# Patient Record
Sex: MALE | Race: WHITE
[De-identification: names, ages, dates, MRNs, and addresses within clinical notes are randomized per-mention and may not be internally consistent; named-entity substitution may affect disease eponyms.]

---

## 2021-06-16 PROBLEM — Z00.00 ENCOUNTER FOR PREVENTIVE HEALTH EXAMINATION: Status: ACTIVE | Noted: 2021-06-16

## 2021-06-18 ENCOUNTER — APPOINTMENT (OUTPATIENT)
Dept: OTOLARYNGOLOGY | Facility: CLINIC | Age: 56
End: 2021-06-18

## 2021-09-22 ENCOUNTER — APPOINTMENT (OUTPATIENT)
Dept: OTOLARYNGOLOGY | Facility: CLINIC | Age: 56
End: 2021-09-22
Payer: COMMERCIAL

## 2021-09-22 VITALS
HEIGHT: 70 IN | HEART RATE: 85 BPM | BODY MASS INDEX: 34.36 KG/M2 | DIASTOLIC BLOOD PRESSURE: 90 MMHG | WEIGHT: 240 LBS | SYSTOLIC BLOOD PRESSURE: 136 MMHG

## 2021-09-22 DIAGNOSIS — J34.89 OTHER SPECIFIED DISORDERS OF NOSE AND NASAL SINUSES: ICD-10-CM

## 2021-09-22 DIAGNOSIS — Z80.8 FAMILY HISTORY OF MALIGNANT NEOPLASM OF OTHER ORGANS OR SYSTEMS: ICD-10-CM

## 2021-09-22 DIAGNOSIS — R06.89 OTHER ABNORMALITIES OF BREATHING: ICD-10-CM

## 2021-09-22 DIAGNOSIS — Z78.9 OTHER SPECIFIED HEALTH STATUS: ICD-10-CM

## 2021-09-22 DIAGNOSIS — R09.81 NASAL CONGESTION: ICD-10-CM

## 2021-09-22 DIAGNOSIS — Z82.49 FAMILY HISTORY OF ISCHEMIC HEART DISEASE AND OTHER DISEASES OF THE CIRCULATORY SYSTEM: ICD-10-CM

## 2021-09-22 DIAGNOSIS — R09.82 POSTNASAL DRIP: ICD-10-CM

## 2021-09-22 DIAGNOSIS — Z80.42 FAMILY HISTORY OF MALIGNANT NEOPLASM OF PROSTATE: ICD-10-CM

## 2021-09-22 DIAGNOSIS — Z85.46 PERSONAL HISTORY OF MALIGNANT NEOPLASM OF PROSTATE: ICD-10-CM

## 2021-09-22 DIAGNOSIS — J32.9 CHRONIC SINUSITIS, UNSPECIFIED: ICD-10-CM

## 2021-09-22 DIAGNOSIS — Z86.39 PERSONAL HISTORY OF OTHER ENDOCRINE, NUTRITIONAL AND METABOLIC DISEASE: ICD-10-CM

## 2021-09-22 PROCEDURE — 31231 NASAL ENDOSCOPY DX: CPT

## 2021-09-22 PROCEDURE — 99244 OFF/OP CNSLTJ NEW/EST MOD 40: CPT | Mod: 25

## 2021-09-22 RX ORDER — SILDENAFIL CITRATE 10 MG/ML
10 POWDER, FOR SUSPENSION ORAL
Refills: 0 | Status: ACTIVE | COMMUNITY

## 2021-09-22 RX ORDER — FLUTICASONE PROPIONATE 50 MCG
50 SPRAY, SUSPENSION NASAL
Refills: 0 | Status: ACTIVE | COMMUNITY

## 2021-09-22 RX ORDER — SODIUM CHLORIDE 0.65 %
0.65 AEROSOL, SPRAY (ML) NASAL
Refills: 0 | Status: ACTIVE | COMMUNITY

## 2021-09-22 RX ORDER — ATORVASTATIN CALCIUM 20 MG/1
20 TABLET, FILM COATED ORAL
Refills: 0 | Status: ACTIVE | COMMUNITY

## 2021-09-22 NOTE — HISTORY OF PRESENT ILLNESS
[de-identified] : 56 year old male referred by Dr. Beverley Glover, ENT, for difficulty breathing through the nose, nasal congestion, sinus pain, sinus pressure, post nasal drip, recurrent sinus infections.  States usually has 2-3 sinus infections every year, treated with antibiotics and steroids.  States has been using Flonase and saline spray daily for the past year.  CT scan of sinus done on 03/19/21, impression: polypoid inflammatory changes in the maxillary sinuses, right ethmoid air cell complex and right sphenoid sinus.  There is mucosal disease that also extends into but does not occlude the left ethmoid infundibulum.  Nasal septal deviation to the right with right-sided bony septal spur.  There are some variations in ethmoid pneumatization which may potentially predispose this patient to bouts of inflammatory sinus disease.

## 2021-09-22 NOTE — PROCEDURE
[Image(s) Captured] : image(s) captured and filed [Video Captured] : video captured and filed [Topical Lidocaine] : topical lidocaine [Oxymetazoline HCl] : oxymetazoline HCl [Flexible Endoscope] : examined with the flexible endoscope [Serial Number: ___] : Serial Number: [unfilled] [Osteomeatal Pathology] : osteomeatal pathology [Recalcitrant Symptoms] : recalcitrant symptoms  [Anatomical Abnormality] : anatomical abnormality [Anterior rhinoscopy insufficient to account for symptoms] : anterior rhinoscopy insufficient to account for symptoms [FreeTextEntry6] : Pre-op indication(s): Nasal obstruction recurrent sinusitis\par Post-op indication(s): Deviated septum obstruction therefore tract of the sinus\par Verbal consent obtained from patient.\par “Anterior rhinoscopy insufficient to account for symptoms” \par Details for procedure: \par Scope #: 209\par Type of scope:    flexible fiber optic telescope  X   Rigid glass telescope \par Anesthesia and/or vasoconstriction was achieved topically by using: \par 4% Lidocaine spray   0.05% Oxymetazoline     Other ______ \par The following anatomic sites were directly examined in a sequential fashion: \par The scope was introduced in the nasal passage between the middle and inferior turbinates to exam the inferior portion of the middle meatus and the fontanelle, as well as the maxillary ostia. Next, the scope was passed medially and posteriorly to the middle turbinates to examine the sphenoethmoid recess and the superior turbinate region. \par Upon visualization the finders are as follows: \par Nasal Septum:    Deviated to right   Spur   right   Contacting Middle turbinate.\par Bleeding site cauterized:    Anterior   left   right   Posterior   left   right \par Method:   Silver Nitrate   YAG Laser    Electrocautery ______ \par Right Side: \par * Mucosa: Normal\par * Mucous: Normal\par * Polyp: Normal\par * Inferior Turbinate: Normal\par * Middle Turbinate: Normal\par * Superior Turbinate: Normal\par * Inferior Meatus: Normal\par * Middle Meatus: Normal\par * Super Meatus: Normal\par * Sphenoethmoidal Recess: Normal\par Left Side: \par * Mucosa: Normal\par * Mucous: Normal\par * Polyp: Normal\par * Inferior Turbinate: Normal\par * Middle Turbinate: Normal\par * Superior Turbinate: Normal\par * Inferior Meatus: Normal\par * Middle Meatus: Normal\par * Super Meatus: Normal\par * Sphenoethmoidal Recess: Normal\par The patient tolerated the procedure well without any complications.\par \par \par

## 2021-09-22 NOTE — DATA REVIEWED
[de-identified] : Nasal septal deviation to the right with right-sided bony septal spur contacting the right middle turbinate polypoid inflammatory changes in both maxillary sinuses right posterior ethmoid and right sphenoid, Left ethmoid disease

## 2021-09-22 NOTE — PHYSICAL EXAM
[] : septum deviated to the right [Midline] : trachea located in midline position [Normal] : no rashes [FreeTextEntry1] : Nasal obstruction recurrent sinusitis , obese [de-identified] : thick [de-identified] : swollen

## 2021-09-22 NOTE — REASON FOR VISIT
[Initial Consultation] : an initial consultation for [FreeTextEntry2] : referred by Dr. Beverley Glover, ENT, for difficulty breathing through the nose, nasal congestion, sinus pain, sinus pressure, post nasal drip, recurrent sinus infections

## 2021-09-22 NOTE — CONSULT LETTER
[Dear  ___] : Dear  [unfilled], [Consult Letter:] : I had the pleasure of evaluating your patient, [unfilled]. [Please see my note below.] : Please see my note below. [Consult Closing:] : Thank you very much for allowing me to participate in the care of this patient.  If you have any questions, please do not hesitate to contact me. [Sincerely,] : Sincerely, [FreeTextEntry3] : Bo Cash MD, JORDI, FACS\par  Department Otolaryngology\par Director of [facility]\par Professor of Otolaryngology, \par [employer]\par

## 2023-12-27 ENCOUNTER — APPOINTMENT (OUTPATIENT)
Dept: ORTHOPEDIC SURGERY | Facility: CLINIC | Age: 58
End: 2023-12-27
Payer: COMMERCIAL

## 2023-12-27 PROCEDURE — 73110 X-RAY EXAM OF WRIST: CPT | Mod: LT

## 2023-12-27 PROCEDURE — L3908: CPT | Mod: LT

## 2023-12-27 PROCEDURE — 20605 DRAIN/INJ JOINT/BURSA W/O US: CPT | Mod: LT

## 2023-12-27 PROCEDURE — 99203 OFFICE O/P NEW LOW 30 MIN: CPT | Mod: 25

## 2023-12-27 NOTE — ASSESSMENT
[FreeTextEntry1] : The patient was advised of the diagnosis. The natural history of the pathology was explained in full to the patient in layman's terms. All questions were answered. The risks and benefits of surgical and non-surgical treatment alternatives were explained in full to the patient.  Medium joint injection was performed of the left wrist. The indication for this procedure was pain and inflammation. The site was prepped with alcohol and ethyl chloride sprayed topically. An injection of Lidocaine 1cc of 1%  was used Betamethasone (Celestone) 1cc of 6mg.  Patient was advised to call if redness, pain or fever occur and apply ice for 15 minutes out of every hour for the next 12-24 hours as tolerated. The risks benefits, and alternatives have been discussed, and verbal consent was obtained  A brace was applied.  The importance of ice and elevation were discussed with the patient.  The risks were also discussed such as compartment syndrome and skin breakdown.  They were instructed to never put foreign objects down the brace.  Patients should call for increasing pain, worsening swelling, numbness, extremity discoloration, or any other concerns.  Pt was given CSI in left wrist pt will wear brace

## 2023-12-27 NOTE — HISTORY OF PRESENT ILLNESS
[7] : 7 [6] : 6 [Throbbing] : throbbing [Constant] : constant [Leisure] : leisure [Meds] : meds [de-identified] : rhd 57 yo male here with 5 day hx of left ulnar sided wrist pain. There is no hx of trauma. Pt denies numbness/tingling.  PMH: hyperlipidemia, prostate CA (remission). Allergies: NKDA.  [] : no [FreeTextEntry5] : ZEKE. Mike Goncalves. Stated pain started 12/23/23. Stated he woke up and was in immediate pain.  Mike n/t.  [FreeTextEntry9] : Advil

## 2023-12-27 NOTE — IMAGING
[de-identified] : left wrist with minimal ulnar sided swelling and ttp over the ECU region. No pain with extension against resistance.  ROM mildly limited and pain is noted with pronation. TFCC Grind is equivocal Petty Test is negative. 1st cmc joint grind test is positive and mild saddle deformity is noted. all digits are nvi  and intrinsic strength is 5/5. Pinch strength is 5/5   Left hand xray shows 1st cmc joint oa (moderate) calcific change noted to region of the ulna styloid.

## 2024-01-11 ENCOUNTER — APPOINTMENT (OUTPATIENT)
Dept: ORTHOPEDIC SURGERY | Facility: CLINIC | Age: 59
End: 2024-01-11
Payer: COMMERCIAL

## 2024-01-11 DIAGNOSIS — M18.12 UNILATERAL PRIMARY OSTEOARTHRITIS OF FIRST CARPOMETACARPAL JOINT, LEFT HAND: ICD-10-CM

## 2024-01-11 DIAGNOSIS — M65.232 CALCIFIC TENDINITIS, LEFT FOREARM: ICD-10-CM

## 2024-01-11 DIAGNOSIS — M18.11 UNILATERAL PRIMARY OSTEOARTHRITIS OF FIRST CARPOMETACARPAL JOINT, RIGHT HAND: ICD-10-CM

## 2024-01-11 PROCEDURE — 99213 OFFICE O/P EST LOW 20 MIN: CPT

## 2024-01-11 PROCEDURE — L3809: CPT

## 2024-01-11 RX ORDER — DICLOFENAC SODIUM 1% 10 MG/G
1 GEL TOPICAL DAILY
Qty: 1 | Refills: 3 | Status: ACTIVE | COMMUNITY
Start: 2024-01-11 | End: 1900-01-01

## 2024-01-11 NOTE — ASSESSMENT
[FreeTextEntry1] : The patient was advised of the diagnosis. The natural history of the pathology was explained in full to the patient in layman's terms. All questions were answered. The risks and benefits of surgical and non-surgical treatment alternatives were explained in full to the patient.  NSAIDs recommended.  Patient warned of risk of NSAID medication to stomach and GI tract, risk of increase blood pressure, cardiac risk, and risk of fluid retention.  The patient should clear taking medication with internist/PMD if any problem with heart, blood pressure, or GI system exists.  A brace was applied.  The importance of ice and elevation were discussed with the patient.  The risks were also discussed such as compartment syndrome and skin breakdown.  They were instructed to never put foreign objects down the brace.  Patients should call for increasing pain, worsening swelling, numbness, extremity discoloration, or any other concerns.  Thumbster brace x 2 voltaren gel Deferred injection today- may return as needed for csi

## 2024-01-11 NOTE — IMAGING
[de-identified] : left wrist with minimal ulnar sided swelling and ttp over the ECU region. No pain with extension against resistance.  ROM mildly limited and pain is noted with pronation. TFCC Grind is equivocal Petty Test is negative. 1st cmc joint grind test is positive and mild saddle deformity is noted. all digits are nvi  and intrinsic strength is 5/5. Pinch strength is 5/5   Left hand xray shows 1st cmc joint oa (moderate) calcific change noted to region of the ulna styloid.

## 2024-01-11 NOTE — HISTORY OF PRESENT ILLNESS
[7] : 7 [6] : 6 [Throbbing] : throbbing [Constant] : constant [Leisure] : leisure [Meds] : meds [de-identified] : 1/11/24: States LT wrist is feeling much better, sig improvement after csi however b/l thumbs have started to bother him- has known base joint OA.   12/27/24: rhd 57 yo male here with 5 day hx of left ulnar sided wrist pain. There is no hx of trauma. Pt denies numbness/tingling.  PMH: hyperlipidemia, prostate CA (remission). Allergies: NKDA.  [] : no [FreeTextEntry5] : ZEKE. Mike Goncalves. Stated pain started 12/23/23. Stated he woke up and was in immediate pain.  Mike n/t.  [FreeTextEntry9] : Advil